# Patient Record
Sex: FEMALE | Race: WHITE
[De-identification: names, ages, dates, MRNs, and addresses within clinical notes are randomized per-mention and may not be internally consistent; named-entity substitution may affect disease eponyms.]

---

## 2018-03-15 LAB
ALBUMIN SERPL-MCNC: 3.9 G/DL (ref 3.4–5)
ALP SERPL-CCNC: 95 U/L (ref 46–116)
ALT SERPL-CCNC: 35 U/L (ref 30–65)
ANION GAP SERPL CALC-SCNC: 7 MMOL/L (ref 7–16)
APTT BLD: 28.9 SECONDS (ref 25–31.3)
AST SERPL-CCNC: 17 U/L (ref 15–37)
BILIRUB SERPL-MCNC: 0.4 MG/DL
BUN SERPL-MCNC: 15 MG/DL (ref 7–18)
CALCIUM SERPL-MCNC: 9.1 MG/DL (ref 8.5–10.1)
CHLORIDE SERPL-SCNC: 105 MMOL/L (ref 98–107)
CO2 SERPL-SCNC: 31 MMOL/L (ref 21–32)
CREAT SERPL-MCNC: 0.8 MG/DL (ref 0.6–1.3)
ESR (SEDRATE): 22 MM/HR (ref 0–30)
GLUCOSE SERPL-MCNC: 129 MG/DL (ref 70–99)
HCT VFR BLD CALC: 41.7 % (ref 37–47)
HGB BLD-MCNC: 14 GM/DL (ref 12–15)
INR PPP: 1
MCH RBC QN AUTO: 30.2 PG (ref 26–34)
MCHC RBC AUTO-ENTMCNC: 33.5 G/DL (ref 28–37)
MCV RBC: 90.2 FL (ref 80–100)
MPV: 8.1 FL. (ref 7.2–11.1)
PLATELET COUNT*: 283 THOU/UL (ref 150–400)
POTASSIUM SERPL-SCNC: 3.7 MMOL/L (ref 3.5–5.1)
PROT SERPL-MCNC: 7.4 G/DL (ref 6.4–8.2)
PROTHROMBIN TIME: 9.7 SECONDS (ref 9.2–11.5)
RBC # BLD AUTO: 4.63 MIL/UL (ref 4.2–5)
RDW-CV: 13 % (ref 10.5–14.5)
SODIUM SERPL-SCNC: 143 MMOL/L (ref 136–145)
WBC # BLD AUTO: 7.9 THOU/UL (ref 4–11)

## 2018-03-16 LAB
EST. AVERAGE GLUCOSE BLD GHB EST-MCNC: 88 MG/DL
GLYCOHEMOGLOBIN (HGB A1C): 4.7 % (ref 4.8–5.6)

## 2018-03-26 ENCOUNTER — HOSPITAL ENCOUNTER (INPATIENT)
Dept: HOSPITAL 96 - M.TBA | Age: 58
LOS: 3 days | Discharge: HOME HEALTH | DRG: 470 | End: 2018-03-29
Attending: INTERNAL MEDICINE | Admitting: INTERNAL MEDICINE
Payer: COMMERCIAL

## 2018-03-26 VITALS — SYSTOLIC BLOOD PRESSURE: 131 MMHG | DIASTOLIC BLOOD PRESSURE: 71 MMHG

## 2018-03-26 VITALS — SYSTOLIC BLOOD PRESSURE: 157 MMHG | DIASTOLIC BLOOD PRESSURE: 75 MMHG

## 2018-03-26 VITALS — DIASTOLIC BLOOD PRESSURE: 50 MMHG | SYSTOLIC BLOOD PRESSURE: 140 MMHG

## 2018-03-26 VITALS — HEIGHT: 62.01 IN | BODY MASS INDEX: 41.79 KG/M2 | WEIGHT: 230 LBS

## 2018-03-26 VITALS — SYSTOLIC BLOOD PRESSURE: 141 MMHG | DIASTOLIC BLOOD PRESSURE: 88 MMHG

## 2018-03-26 DIAGNOSIS — Z28.21: ICD-10-CM

## 2018-03-26 DIAGNOSIS — M17.11: Primary | ICD-10-CM

## 2018-03-26 DIAGNOSIS — Z80.8: ICD-10-CM

## 2018-03-26 DIAGNOSIS — Z90.49: ICD-10-CM

## 2018-03-26 DIAGNOSIS — H54.40: ICD-10-CM

## 2018-03-26 DIAGNOSIS — Z82.49: ICD-10-CM

## 2018-03-26 DIAGNOSIS — Z98.1: ICD-10-CM

## 2018-03-26 PROCEDURE — 0SRC0J9 REPLACEMENT OF RIGHT KNEE JOINT WITH SYNTHETIC SUBSTITUTE, CEMENTED, OPEN APPROACH: ICD-10-PCS | Performed by: ORTHOPAEDIC SURGERY

## 2018-03-26 NOTE — NUR
PATIENT REMAINS ALERT AND ORIENTED. REPORTS SLIGHT DISCOMFORT IN KNEE BUT NO
PAIN, PATIENT HAD SPINAL AND NERVE BLOCK. ENCOURAGED TO TAKE OXYIR SO WE COULD
STAY AHEAD OF PAIN. OXYIR 5MG GIVEN AT THIS TIME. TOLERATING MEALS. GARCIA IN
PLACE WITH LIGHT YELLOW URINE. SCD'S AND MICHAEL IN PLACE. DRESSING TO KNEE DRY
AND INTACT. BED ALARM SET. WILL CONTINUE TO MONITOR.

## 2018-03-26 NOTE — NUR
PATIENT TRANSFERRED FROM PACU TO ROOM 114. ALERT AND ORIENTED. DENIES PAIN. O2
SAT 97% PER CONT PULSE OX. TOLERATING LIQUIDS. LUNCH ORDERED. DRESSING TO
RIGHT KNEE DRY AND INTACT. SCD'S IN PLACE. TEDS TO LEFT LEG. VSS. DAUGHTER IN
LAW AT BEDSIDE. EDUCATED ON FALL PREVENTION. BED ALARM SET. WILL CONTINUE TO
MONITOR.

## 2018-03-27 VITALS — DIASTOLIC BLOOD PRESSURE: 71 MMHG | SYSTOLIC BLOOD PRESSURE: 119 MMHG

## 2018-03-27 VITALS — DIASTOLIC BLOOD PRESSURE: 69 MMHG | SYSTOLIC BLOOD PRESSURE: 122 MMHG

## 2018-03-27 VITALS — SYSTOLIC BLOOD PRESSURE: 118 MMHG | DIASTOLIC BLOOD PRESSURE: 56 MMHG

## 2018-03-27 VITALS — SYSTOLIC BLOOD PRESSURE: 124 MMHG | DIASTOLIC BLOOD PRESSURE: 62 MMHG

## 2018-03-27 VITALS — DIASTOLIC BLOOD PRESSURE: 56 MMHG | SYSTOLIC BLOOD PRESSURE: 118 MMHG

## 2018-03-27 VITALS — DIASTOLIC BLOOD PRESSURE: 82 MMHG | SYSTOLIC BLOOD PRESSURE: 143 MMHG

## 2018-03-27 LAB
HCT VFR BLD CALC: 33.3 % (ref 37–47)
HGB BLD-MCNC: 11.4 GM/DL (ref 12–15)

## 2018-03-27 NOTE — NUR
RECIEVED O.T. EVAL AND TX.  WILL DEFER TO P.T. AND NURSING.  PLEASE ORDER
FURTHER O.T. SERVICES IF NEEDED.

## 2018-03-27 NOTE — NUR
PATIENT REMAINS ALERT AND ORIENTED. PAIN CONTROLLED WITH OXYIR. PARTICIPTAED
WITH PT. TRANSFERS AND AMBULATES WITH STANDBY ASSIST,GB, AND WALKER. VOIDING
PER BSC. IV SALINE LOCKED. RA SAT 98%. TEDS AND SCD'S IN PLACE. TOLERATING
MEALS. CALL LIGHT WITHIN REACH. WILL CONTINUE TO MONITIOR.

## 2018-03-28 VITALS — SYSTOLIC BLOOD PRESSURE: 145 MMHG | DIASTOLIC BLOOD PRESSURE: 81 MMHG

## 2018-03-28 VITALS — DIASTOLIC BLOOD PRESSURE: 79 MMHG | SYSTOLIC BLOOD PRESSURE: 136 MMHG

## 2018-03-28 VITALS — DIASTOLIC BLOOD PRESSURE: 86 MMHG | SYSTOLIC BLOOD PRESSURE: 151 MMHG

## 2018-03-28 VITALS — SYSTOLIC BLOOD PRESSURE: 153 MMHG | DIASTOLIC BLOOD PRESSURE: 77 MMHG

## 2018-03-28 VITALS — SYSTOLIC BLOOD PRESSURE: 145 MMHG | DIASTOLIC BLOOD PRESSURE: 82 MMHG

## 2018-03-28 VITALS — DIASTOLIC BLOOD PRESSURE: 75 MMHG | SYSTOLIC BLOOD PRESSURE: 149 MMHG

## 2018-03-28 LAB
HCT VFR BLD CALC: 32.6 % (ref 37–47)
HGB BLD-MCNC: 11.2 GM/DL (ref 12–15)

## 2018-03-28 NOTE — NUR
PT.UP IN CHAIR. SHE WAS ALERT AND ORIENTED. SHE SAID SHE LIVES ALONE. HER
FAMILY IS SUPPORTIVE. HER MOM WILL BE COMING TO STAY WITH HER FOR THE FIRST
FEW DAYS. SHE IS HOPEFUL SHE WILL STAY THE FULL 2 WEEKS WITH HER. HER SON
WORKS CLOSE TO HER AND SHE KNOWS HE WILL CHECK ON HER,ALSO. SHE WILL NEED A
WALKER FOR HOME. SHE IS NORMALLY INDEPENDENT AND WORKS FULL TIME. DISCUSSED
HOME HEALTH VS.OUTPT.THERAPY. SHE WOULD LIKE TO DO HH INITIALLY. [facility] REFERRAL MADE TO [facility] AND THEY WILL ACCEPT HER. PT.SAID
SHE WILL NOT HAVE HELP AT HOME UNTIL TOMORROW. PLAN ON DISCHARGE TOMORROW. IV discontinued, cath removed intact

## 2018-03-28 NOTE — NUR
PATIENT REMAINS ALERT AND ORIENTED. DENIES PAIN. PARTICIPATED WITH PT.
SHOWERED THIS AM. TRANSFERS AND AMBULATES WITH ASSIST OF 1,GB,WALKER. DRESSING
DRY AND INTACT. PLANS TO DC TOMORROW WHEN SISTER CAN STAY WITH HER. PAIN
CONTROLLED WITH OXYIR. UP TO CHAIR. VOIDING PER BSC. BED ALARM IN USE. CALL
LIGHT WITHIN REACH. WILL CONTINUE TO MONITOR.

## 2018-03-28 NOTE — NUR
PATIENT HAS RESTED WELL THROUGHOUT THE NIGHT WITHOUT ANY ISSUES. PAIN WELL
CONTROLLED. VSS ON RA. PATIENT IS UP WITH ASSIST X 1 WITH WALKER AND GAITBELT
TO THE BSC. PATIENT IS URINATING ADEQUATELY. DRESSING TO RIGHT KNEE IS C/D/I,
MICHAEL HOSE AND SCD'S IN PLACE. PATIENT INSTRUCTED TO USE CALL LIGHT WHEN NEEDING
ASSISTANCE. HOURLY ROUNDS MADE. WILL CONTINUE WITH PLAN OF CARE AND NURSING TO
MONITOR.

## 2018-03-29 VITALS — SYSTOLIC BLOOD PRESSURE: 118 MMHG | DIASTOLIC BLOOD PRESSURE: 56 MMHG

## 2018-03-29 VITALS — DIASTOLIC BLOOD PRESSURE: 78 MMHG | SYSTOLIC BLOOD PRESSURE: 135 MMHG

## 2018-03-29 VITALS — DIASTOLIC BLOOD PRESSURE: 70 MMHG | SYSTOLIC BLOOD PRESSURE: 114 MMHG

## 2018-03-29 NOTE — NUR
PATIENT LEFT UNIT BY WHEELCHAIR WITH NURSING STAFF AT 1240. IV DC'D. EDUCATED
PATIENT AND FAMILY ON DISCHARGE INSTRUCTIONS AND NEW MED SCRIPTS. PATIENT AND
FAMILY VERBALIZED UNDERSTANDING. ALL BELONGINGS LEFT WITH PATIENT.

## 2018-03-29 NOTE — NUR
COPAY FOR ELIQUIS PER PHARMACY IS $25. INFORMED PT.  SHE IS PACKING . FAMILY
HERE. THERAPY DISPENSED HER WALKER AND IN ROOM.  INFORMED HER NURSE WAS
WORKING ON HER DISCHARGE INSTRUCTIONS.

## 2018-03-29 NOTE — NUR
PATIENT HAS REMAINED ALERT AND ORIENTED X 4 THROUGHOUT THE SHIFT AND RESTING
QUIETLY ON HOURLY ROUNDS. DRESSING CLEAN AND DRY RIGHT KNEE. MEDICATED FOR
PAIN X 1 TO GOOD EFFECT. UP TO BR TO VOID. PASSING GAS. MILK OF MAGNESIA AT
BEDSIDE. VITAL SIGNS STABLE. CONTINUE TO MONITOR.

## 2018-04-30 NOTE — OP
[address]  [address]                    OPERATIVE REPORT              
_______________________________________________________________________________
 
Name:       MANOJ,LIZ L             Room:           [address]#:  [id number]      Account #:      [id number]  
Admission:  03/26/18     Attend Phys:    YOANA Rangel
Discharge:  03/29/18     Date of Birth:  [birth year]  
         Report #: [id number]
                                                                     [id number]  
_______________________________________________________________________________
THIS REPORT FOR:  //name//                      
 
CC: NO PCP
    Deion White
 
DICTATED BY: Jass Jaeger DO
 
DATE OF SERVICE:  03/26/2018
 
 
Dr. Jass Jaeger dictating operative report on behalf of Dr. Deion Wagner.
 
PREOPERATIVE DIAGNOSIS:  Right knee degenerative joint disease.
 
POSTOPERATIVE DIAGNOSES:  Right knee degenerative joint disease.
 
PROCEDURE:  Right total knee arthroplasty utilizing Bi[employer] Red Mapacheguard knee system
with the following components:
1.  Right 60 mm CR femoral component.
2.  Right 71 mm fixed cruciate tibial plate.
3.  A 10 mm AS tibial bearing.
4.  A 28 mm series A thin round patella.
 
SURGEON:  Deion Wagner DO.
 
ASSISTANT:  Jass Jaeger DO and Simon Mariano DO.
 
ANESTHESIA:  Spinal with light sedation.
 
ESTIMATED BLOOD LOSS:  175 mL.
 
ANTIBIOTICS:  2 grams Ancef IV preoperatively.
 
SPECIMENS:  None.
 
COMPLICATIONS:  None.
 
DRAINS:  None.
 
DISPOSITION:  Stable to PACU and will be admitted to the hospital for standard
postoperative care.
 
INDICATION FOR PROCEDURE:  The patient is a pleasant 57-year-old female who was
seen the [facility] with complaints of chronic right knee pain.  On
radiographs, she was found to have degenerative joint disease, most notably in
 
 
 
[place] [address]                    OPERATIVE REPORT              
_______________________________________________________________________________
 
Name:       LIZ ARANDA             Room:           [address]#:  [id number]      Account #:      [id number]  
Admission:  03/26/18     Attend Phys:    YOANA Rangel
Discharge:  03/29/18     Date of Birth:  [birth year]  
         Report #: [id number]
                                                                     [id number]  
_______________________________________________________________________________
the medial compartment with subchondral sclerosis, osteophytic lipping noted. 
Her right knee pain was refractory to conservative measures consisting of oral
anti-inflammatories, activity modifications, attempted weight loss and multiple
corticosteroid injections for much greater than 6 months' duration.  The
patient's pain was impacting her quality of life preventing her from performing
activities that she wishes to.  Therefore, she was recommended for a right total
knee arthroplasty.  Risks, benefits, complications, indications, and alternative
treatments were discussed and the patient wished to proceed with surgery today.
 
DESCRIPTION OF PROCEDURE:  The patient was seen in preoperative holding area,
correct operative site, right knee was initialed.  The patient was taken back to
operating suite and placed in the supine position on the operating table after a
standard spinal was performed by the anesthesia.  The patient was given light
sedation throughout the procedure.  The right lower extremity was prepped and
draped in typical fashion.
 
Surgery began with a timeout, identifying correct patient and correct procedure,
preoperative antibiotics, correct operative site and the performing surgeon.  A
standard midline incision was made directly overlying the anterior aspect of the
right knee.  Skin and subcutaneous tissues were sharply incised with a 20 blade
scalpel down to the level of the prepatellar fascia and the quadriceps tendon,
which were identified and dissected out with full-thickness skin flaps more so
medial than lateral.  Next, our standard medial parapatellar arthrotomy was
performed with a new 10 blade knife.  Proximal medial tibia, we performed both a
medial and to a lesser extent lateral subperiosteal dissection for better
exposure of the tibia and anterior third of the medial and lateral menisci were
excised this point.  Patella was everted.  Infrapatellar fat pad was debrided
and lateral aspect of the tibia was also debrided and fat was excised for better
visualization.  ACL was removed at this point.  Intramedullary entry drill was
inserted in the femur in typical fashion followed by the intramedullary distal
femur cutting guide.  The cutting guide was pinned into place and we resected
roughly 9 mm of distal femoral bone using the oscillating saw.  Bony debris was
removed.  Next, attention was turned to making our proximal tibia cut.  I used
an extramedullary tibial guide.  It was aligned with the medial third of the
tibial tubercle.  The tibial crest in the center of the talus and the second
metatarsal slope was found to be rather appropriate and a tibial cutting block
was pinned into place and then shifted up 2 mm for a total resection of 8 mm.  A
proximal tibia cut was performed using oscillating saw.  All bony debris was
removed.  Knee was taken out to extension.  A 10 mm spacer block was inserted
and found to achieve full extension and rather stable with both varus and valgus
and symmetric.  Next, attention was turned to finishing our femoral cuts.  AP
sizer was used to measure roughly for a 60 mm femoral component with 3 degrees
of external rotation.  Holes were drilled in our AP sizer and our four-in-one
distal femoral cutting block was impacted into place.  Next, we performed our
anterior and posterior femoral cuts as well as our anterior and posterior
chamfer cuts.  All bony debris were removed.  Both medial and lateral menisci
 
 
 
[address]  [address]                    OPERATIVE REPORT              
_______________________________________________________________________________
 
Name:       LIZ ARANDA             Room:           [address] [place]  
[facility]#:  [id number]      Account #:      [id number]  
Admission:  03/26/18     Attend Phys:    YOANA Rangel
Discharge:  03/29/18     Date of Birth:  [birth year]  
         Report #: [id number]
                                                                     [id number]  
_______________________________________________________________________________
were excised using the electrocautery.  Next, our tibia was sized to roughly 71
mm tibial trial tray, which was pinned into place after using the removal drop
giuseppe for appropriate alignment.  Next, our trial femur was impacted into place
and a 10 mm polyethylene spacer was inserted.  Knee was taken through range of
motion and felt to be stable to varus and valgus stress and rather stable with
anterior and posterior drawer testing at 90 degrees of flexion.  The patient had
full range of motion with extension and flexion.  Attention was turned to the
patella.  Using the [employer] reaming system, patella was resurfaced to the
appropriate thickness excising all degenerative cartilage and it was sized to
roughly a 28 mm round patellar component, which was drilled using the drill
guide and placed into appropriate position.  Knee was taken through range of
motion.  Patella seemed to be tracking appropriately.  The trial patella was
then removed.  At this point, the femur was drilled on the condylar surfaces and
tibia was reamed and a cruciate punch performed on the tibia trial.  Next, all
trial components were removed and bony surfaces were irrigated sufficiently and
the final components were cemented into place starting with the tibia and all
excess cement was removed after impacting the final tibial tray into place, then
followed in a similar fashion by the femur, which was impacted into place.  All
bony excess was removed.  The patella was also cemented into place and held with
the patellar clamp.  A final 10 mm polyethylene spacer was then inserted and
locked into place using the locking clip.  Patella was then flipped back to its
native position and the knee was positioned at roughly 90 degrees of flexion.  A
thorough irrigation was then performed.  A capsular layer was then closed using
multiple #1 Vicryl sutures in a figure-of-eight fashion followed by a running #1
Stratafix barbed suture.  Subcutaneous tissues were irrigated and closed in a
simple inverted fashion with 2-0 Monocryl suture followed by running
subcuticular stitch using a 3-0 Stratafix followed by Dermabond skin glue. 
Standard dressings were applied consisting of Mepilex and a thigh-high MICHAEL hose.
 The patient was weaned from general anesthetic, transferred in stable condition
to PACU.
 
All sponge and needle counts were correct x 2.  Standard PQRS postoperative care
will be implemented consisting of appropriate anticoagulation for DVT
prophylaxis as well as 2 postoperative doses of antibiotics within 24 hours and
physical therapy will be started for mobilization.
 
 
 
 
 
 
 
 
 
<ELECTRONICALLY SIGNED>
                                        By:  Deion Wagner DO            
04/30/18     1645
D: 03/26/18 1552_______________________________________
T: 03/26/18 1849Deion Wagner DO               /nt

## 2022-10-03 NOTE — NUR
Problem: Pain  Goal: #Acceptable pain level achieved/maintained at rest using NRS/Faces  Description: This goal is used for patients who can self-report.  Acceptable means the level is at or below the identified comfort/function goal.  Outcome: Outcome Met, Continue evaluating goal progress toward completion     Problem: Impaired Physical Mobility  Goal: # Bed mobility, ambulation, and ADLs are maintained or returned to baseline during hospitalization  Outcome: Outcome Met, Continue evaluating goal progress toward completion     Problem: Ostomy Management  Goal: Maintains skin integrity around stoma  Outcome: Outcome Met, Continue evaluating goal progress toward completion     Problem: Respiratory Impairment - Respiratory Therapy 253  Goal: Demonstrates optimal level of respiratory function 4006  Outcome: Outcome Met, Continue evaluating goal progress toward completion     Problem: Breathing Pattern Ineffective  Goal: Air exchange is effective, demonstrated by Sp02 sat of greater then or = 92% (or as ordered)  Outcome: Outcome Met, Continue evaluating goal progress toward completion     Problem: Artificial Airway Management  Goal: # Maintains effective artificial airway  Outcome: Outcome Met, Continue evaluating goal progress toward completion      PATIENT ALERT AND ORIENTED. VITALS STABLE. DESATS WHEN SLEEPING. PLACED ON 3L
OF OXYGEN, CONTINUOUS PULSE OX IN PLACE. PAIN CONTROLLED WITH PO MEDICATION.
GARCIA TO DEPENDENT DRAINAGE, DRAINING LIGHT YELLOW URINE. RIGHT KNEE DRESSING
C/D/I. FLUIDS INFUSING PER ORDER. DENIES NAUSEA. BED ALARM IN USE. HOURLY
ROUNDS. NURSING WILL CONTINUE TO MONITOR.